# Patient Record
Sex: MALE | Race: WHITE | NOT HISPANIC OR LATINO | URBAN - METROPOLITAN AREA
[De-identification: names, ages, dates, MRNs, and addresses within clinical notes are randomized per-mention and may not be internally consistent; named-entity substitution may affect disease eponyms.]

---

## 2018-02-06 ENCOUNTER — HOSPITAL ENCOUNTER (OUTPATIENT)
Facility: MEDICAL CENTER | Age: 53
End: 2018-02-07
Attending: SURGERY | Admitting: SURGERY

## 2018-02-06 DIAGNOSIS — G89.18 POST-OPERATIVE PAIN: ICD-10-CM

## 2018-02-06 DIAGNOSIS — K40.90 RIGHT INGUINAL HERNIA: ICD-10-CM

## 2018-02-06 PROCEDURE — 700111 HCHG RX REV CODE 636 W/ 250 OVERRIDE (IP)

## 2018-02-06 PROCEDURE — 502704 HCHG DEVICE, LIGASURE IMPACT: Performed by: SURGERY

## 2018-02-06 PROCEDURE — 700102 HCHG RX REV CODE 250 W/ 637 OVERRIDE(OP): Performed by: SURGERY

## 2018-02-06 PROCEDURE — 160003 HCHG RECOVERY MINUTES (EXTENDED) STAT: Performed by: SURGERY

## 2018-02-06 PROCEDURE — 160039 HCHG SURGERY MINUTES - EA ADDL 1 MIN LEVEL 3: Performed by: SURGERY

## 2018-02-06 PROCEDURE — A9270 NON-COVERED ITEM OR SERVICE: HCPCS | Performed by: SURGERY

## 2018-02-06 PROCEDURE — 88300 SURGICAL PATH GROSS: CPT

## 2018-02-06 PROCEDURE — 700102 HCHG RX REV CODE 250 W/ 637 OVERRIDE(OP)

## 2018-02-06 PROCEDURE — 160002 HCHG RECOVERY MINUTES (STAT): Performed by: SURGERY

## 2018-02-06 PROCEDURE — 160028 HCHG SURGERY MINUTES - 1ST 30 MINS LEVEL 3: Performed by: SURGERY

## 2018-02-06 PROCEDURE — 700112 HCHG RX REV CODE 229: Performed by: SURGERY

## 2018-02-06 PROCEDURE — G0378 HOSPITAL OBSERVATION PER HR: HCPCS

## 2018-02-06 PROCEDURE — A9270 NON-COVERED ITEM OR SERVICE: HCPCS

## 2018-02-06 PROCEDURE — 160048 HCHG OR STATISTICAL LEVEL 1-5: Performed by: SURGERY

## 2018-02-06 PROCEDURE — 501838 HCHG SUTURE GENERAL: Performed by: SURGERY

## 2018-02-06 PROCEDURE — 501445 HCHG STAPLER, SKIN DISP: Performed by: SURGERY

## 2018-02-06 PROCEDURE — 90686 IIV4 VACC NO PRSV 0.5 ML IM: CPT | Performed by: SURGERY

## 2018-02-06 PROCEDURE — 700101 HCHG RX REV CODE 250

## 2018-02-06 PROCEDURE — 700111 HCHG RX REV CODE 636 W/ 250 OVERRIDE (IP): Performed by: SURGERY

## 2018-02-06 PROCEDURE — 90471 IMMUNIZATION ADMIN: CPT

## 2018-02-06 PROCEDURE — 500380 HCHG DRAIN, PENROSE 1/4X12: Performed by: SURGERY

## 2018-02-06 PROCEDURE — 160035 HCHG PACU - 1ST 60 MINS PHASE I: Performed by: SURGERY

## 2018-02-06 PROCEDURE — 502000 HCHG MISC OR IMPLANTS RC 0278: Performed by: SURGERY

## 2018-02-06 PROCEDURE — 160009 HCHG ANES TIME/MIN: Performed by: SURGERY

## 2018-02-06 PROCEDURE — 160036 HCHG PACU - EA ADDL 30 MINS PHASE I: Performed by: SURGERY

## 2018-02-06 DEVICE — IMPLANTABLE DEVICE: Type: IMPLANTABLE DEVICE | Site: GROIN | Status: FUNCTIONAL

## 2018-02-06 RX ORDER — ENEMA 19; 7 G/133ML; G/133ML
1 ENEMA RECTAL
Status: DISCONTINUED | OUTPATIENT
Start: 2018-02-06 | End: 2018-02-07 | Stop reason: HOSPADM

## 2018-02-06 RX ORDER — CELECOXIB 200 MG/1
CAPSULE ORAL
Status: COMPLETED
Start: 2018-02-06 | End: 2018-02-06

## 2018-02-06 RX ORDER — BISACODYL 10 MG
10 SUPPOSITORY, RECTAL RECTAL
Status: DISCONTINUED | OUTPATIENT
Start: 2018-02-06 | End: 2018-02-07 | Stop reason: HOSPADM

## 2018-02-06 RX ORDER — DOCUSATE SODIUM 100 MG/1
100 CAPSULE, LIQUID FILLED ORAL 2 TIMES DAILY
Status: DISCONTINUED | OUTPATIENT
Start: 2018-02-06 | End: 2018-02-07 | Stop reason: HOSPADM

## 2018-02-06 RX ORDER — AMOXICILLIN 250 MG
1 CAPSULE ORAL
Status: DISCONTINUED | OUTPATIENT
Start: 2018-02-06 | End: 2018-02-07 | Stop reason: HOSPADM

## 2018-02-06 RX ORDER — LIDOCAINE AND PRILOCAINE 25; 25 MG/G; MG/G
1 CREAM TOPICAL
Status: ACTIVE | OUTPATIENT
Start: 2018-02-06 | End: 2018-02-07

## 2018-02-06 RX ORDER — MIDAZOLAM HYDROCHLORIDE 1 MG/ML
INJECTION INTRAMUSCULAR; INTRAVENOUS
Status: DISPENSED
Start: 2018-02-06 | End: 2018-02-06

## 2018-02-06 RX ORDER — OLANZAPINE 20 MG/1
20 TABLET ORAL NIGHTLY
COMMUNITY

## 2018-02-06 RX ORDER — SODIUM CHLORIDE, SODIUM LACTATE, POTASSIUM CHLORIDE, CALCIUM CHLORIDE 600; 310; 30; 20 MG/100ML; MG/100ML; MG/100ML; MG/100ML
INJECTION, SOLUTION INTRAVENOUS CONTINUOUS
Status: DISCONTINUED | OUTPATIENT
Start: 2018-02-06 | End: 2018-02-06

## 2018-02-06 RX ORDER — OXYCODONE HYDROCHLORIDE 5 MG/1
5 TABLET ORAL EVERY 4 HOURS PRN
Status: DISCONTINUED | OUTPATIENT
Start: 2018-02-06 | End: 2018-02-07 | Stop reason: HOSPADM

## 2018-02-06 RX ORDER — LIDOCAINE HYDROCHLORIDE 10 MG/ML
0.5 INJECTION, SOLUTION INFILTRATION; PERINEURAL
Status: ACTIVE | OUTPATIENT
Start: 2018-02-06 | End: 2018-02-07

## 2018-02-06 RX ORDER — ACETAMINOPHEN 325 MG/1
650 TABLET ORAL EVERY 4 HOURS PRN
Status: DISCONTINUED | OUTPATIENT
Start: 2018-02-06 | End: 2018-02-07 | Stop reason: HOSPADM

## 2018-02-06 RX ORDER — LIDOCAINE HYDROCHLORIDE 10 MG/ML
INJECTION, SOLUTION INFILTRATION; PERINEURAL
Status: COMPLETED
Start: 2018-02-06 | End: 2018-02-06

## 2018-02-06 RX ORDER — OLANZAPINE 5 MG/1
20 TABLET ORAL NIGHTLY
Status: DISCONTINUED | OUTPATIENT
Start: 2018-02-06 | End: 2018-02-07 | Stop reason: HOSPADM

## 2018-02-06 RX ORDER — GABAPENTIN 300 MG/1
CAPSULE ORAL
Status: COMPLETED
Start: 2018-02-06 | End: 2018-02-06

## 2018-02-06 RX ORDER — BETAMETHASONE DIPROPIONATE 0.5 MG/G
1 CREAM TOPICAL 2 TIMES DAILY
COMMUNITY

## 2018-02-06 RX ORDER — ONDANSETRON 2 MG/ML
4 INJECTION INTRAMUSCULAR; INTRAVENOUS EVERY 4 HOURS PRN
Status: DISCONTINUED | OUTPATIENT
Start: 2018-02-06 | End: 2018-02-07 | Stop reason: HOSPADM

## 2018-02-06 RX ORDER — ACETAMINOPHEN 500 MG
TABLET ORAL
Status: COMPLETED
Start: 2018-02-06 | End: 2018-02-06

## 2018-02-06 RX ORDER — BUPIVACAINE HYDROCHLORIDE AND EPINEPHRINE 5; 5 MG/ML; UG/ML
INJECTION, SOLUTION EPIDURAL; INTRACAUDAL; PERINEURAL
Status: DISCONTINUED | OUTPATIENT
Start: 2018-02-06 | End: 2018-02-06 | Stop reason: HOSPADM

## 2018-02-06 RX ORDER — OXYCODONE HYDROCHLORIDE 5 MG/1
5-10 TABLET ORAL EVERY 4 HOURS PRN
Qty: 40 TAB | Refills: 0 | Status: SHIPPED | OUTPATIENT
Start: 2018-02-06 | End: 2018-02-13

## 2018-02-06 RX ORDER — OXYCODONE HYDROCHLORIDE 10 MG/1
10 TABLET ORAL EVERY 4 HOURS PRN
Status: DISCONTINUED | OUTPATIENT
Start: 2018-02-06 | End: 2018-02-07 | Stop reason: HOSPADM

## 2018-02-06 RX ORDER — POLYETHYLENE GLYCOL 3350 17 G/17G
1 POWDER, FOR SOLUTION ORAL 2 TIMES DAILY
Status: DISCONTINUED | OUTPATIENT
Start: 2018-02-06 | End: 2018-02-07 | Stop reason: HOSPADM

## 2018-02-06 RX ORDER — OXYCODONE HCL 5 MG/5 ML
SOLUTION, ORAL ORAL
Status: COMPLETED
Start: 2018-02-06 | End: 2018-02-06

## 2018-02-06 RX ORDER — AMOXICILLIN 250 MG
1 CAPSULE ORAL NIGHTLY
Status: DISCONTINUED | OUTPATIENT
Start: 2018-02-06 | End: 2018-02-07 | Stop reason: HOSPADM

## 2018-02-06 RX ADMIN — OLANZAPINE 20 MG: 5 TABLET, FILM COATED ORAL at 23:17

## 2018-02-06 RX ADMIN — INFLUENZA A VIRUS A/MICHIGAN/45/2015 X-275 (H1N1) ANTIGEN (FORMALDEHYDE INACTIVATED), INFLUENZA A VIRUS A/HONG KONG/4801/2014 X-263B (H3N2) ANTIGEN (FORMALDEHYDE INACTIVATED), INFLUENZA B VIRUS B/PHUKET/3073/2013 ANTIGEN (FORMALDEHYDE INACTIVATED), AND INFLUENZA B VIRUS B/BRISBANE/60/2008 ANTIGEN (FORMALDEHYDE INACTIVATED) 0.5 ML: 15; 15; 15; 15 INJECTION, SUSPENSION INTRAMUSCULAR at 23:10

## 2018-02-06 RX ADMIN — GABAPENTIN 600 MG: 300 CAPSULE ORAL at 09:15

## 2018-02-06 RX ADMIN — ACETAMINOPHEN 650 MG: 325 TABLET, FILM COATED ORAL at 23:13

## 2018-02-06 RX ADMIN — ACETAMINOPHEN 1000 MG: 500 TABLET, FILM COATED ORAL at 09:15

## 2018-02-06 RX ADMIN — CELECOXIB 400 MG: 200 CAPSULE ORAL at 09:15

## 2018-02-06 RX ADMIN — STANDARDIZED SENNA CONCENTRATE AND DOCUSATE SODIUM 1 TABLET: 8.6; 5 TABLET, FILM COATED ORAL at 23:15

## 2018-02-06 RX ADMIN — SODIUM CHLORIDE, SODIUM LACTATE, POTASSIUM CHLORIDE, CALCIUM CHLORIDE: 600; 310; 30; 20 INJECTION, SOLUTION INTRAVENOUS at 08:15

## 2018-02-06 RX ADMIN — POLYETHYLENE GLYCOL 3350 1 PACKET: 17 POWDER, FOR SOLUTION ORAL at 16:38

## 2018-02-06 RX ADMIN — OXYCODONE HYDROCHLORIDE 5 MG: 5 TABLET ORAL at 17:24

## 2018-02-06 RX ADMIN — LIDOCAINE HYDROCHLORIDE 0.5 ML: 10 INJECTION, SOLUTION INFILTRATION; PERINEURAL at 08:15

## 2018-02-06 RX ADMIN — DOCUSATE SODIUM 100 MG: 100 CAPSULE ORAL at 16:38

## 2018-02-06 RX ADMIN — FENTANYL CITRATE 50 MCG: 50 INJECTION, SOLUTION INTRAMUSCULAR; INTRAVENOUS at 11:45

## 2018-02-06 RX ADMIN — VALPROIC ACID 1500 MG: 250 SOLUTION ORAL at 23:18

## 2018-02-06 RX ADMIN — OXYCODONE HYDROCHLORIDE 10 MG: 5 SOLUTION ORAL at 11:58

## 2018-02-06 ASSESSMENT — COGNITIVE AND FUNCTIONAL STATUS - GENERAL
SUGGESTED CMS G CODE MODIFIER MOBILITY: CH
MOBILITY SCORE: 24
DAILY ACTIVITIY SCORE: 24
SUGGESTED CMS G CODE MODIFIER DAILY ACTIVITY: CH

## 2018-02-06 ASSESSMENT — PATIENT HEALTH QUESTIONNAIRE - PHQ9
SUM OF ALL RESPONSES TO PHQ9 QUESTIONS 1 AND 2: 0
1. LITTLE INTEREST OR PLEASURE IN DOING THINGS: NOT AT ALL
2. FEELING DOWN, DEPRESSED, IRRITABLE, OR HOPELESS: NOT AT ALL
SUM OF ALL RESPONSES TO PHQ QUESTIONS 1-9: 0

## 2018-02-06 ASSESSMENT — PAIN SCALES - GENERAL
PAINLEVEL_OUTOF10: 3
PAINLEVEL_OUTOF10: 6
PAINLEVEL_OUTOF10: 5
PAINLEVEL_OUTOF10: 6
PAINLEVEL_OUTOF10: 1
PAINLEVEL_OUTOF10: 1
PAINLEVEL_OUTOF10: 5
PAINLEVEL_OUTOF10: 2
PAINLEVEL_OUTOF10: 6
PAINLEVEL_OUTOF10: 3

## 2018-02-06 ASSESSMENT — LIFESTYLE VARIABLES
EVER_SMOKED: YES
DO YOU DRINK ALCOHOL: NO

## 2018-02-06 ASSESSMENT — COPD QUESTIONNAIRES
DO YOU EVER COUGH UP ANY MUCUS OR PHLEGM?: NO/ONLY WITH OCCASIONAL COLDS OR INFECTIONS
DURING THE PAST 4 WEEKS HOW MUCH DID YOU FEEL SHORT OF BREATH: NONE/LITTLE OF THE TIME
COPD SCREENING SCORE: 3
HAVE YOU SMOKED AT LEAST 100 CIGARETTES IN YOUR ENTIRE LIFE: YES

## 2018-02-06 NOTE — OP REPORT
DATE OF SERVICE:  02/06/2018    PREOPERATIVE DIAGNOSIS:  Large reducible symptomatic right inguinal hernia.    POSTOPERATIVE DIAGNOSIS:  Large reducible symptomatic right inguinal hernia.    PROCEDURE PERFORMED:  Right inguinal hernia repair with mesh.    PRIMARY SURGEON:  Henry Ryan MD    ASSISTANT:  None.    ANESTHESIOLOGIST:  Bob Gomes MD.    ANESTHESIA:  General laryngeal mask airway.    ESTIMATED BLOOD LOSS:  20 mL    COMPLICATIONS:  None immediately apparent.    SPECIMENS:  Segment of hernia sac.    OPERATIVE FINDINGS:  Patient had a very large indirect hernia sac that was   densely scarred to the surrounding abdominal wall musculature, the inguinal   canal, and to the spermatic cord structures.    DESCRIPTION OF PROCEDURE:  The patient was taken back to the operating room   and placed in supine position.  General anesthesia was induced.  The patient   had a laryngeal mask airway was placed.  Bilateral SCD devices were placed.    Appropriate IV antibiotics were given.  All bony prominences were carefully   protected.  The right lower abdomen was then widely prepped and draped in a   sterile fashion.  A time-out was performed.  The patient and procedure, both   verified.    A 6 cm incision was made over the inguinal crease.  The subcutaneous tissues   were divided down to level of the external oblique aponeurosis using   electrocautery.  The aponeurosis was then opened along its fibers through the   external ring.  Careful meticulous dissection of the hernia sac along with the   cord structures from the pubic tubercle was then undertaken.  This was very   tedious and difficult dissection given the amount of inflammation and scarring   in this area.  Once freed, the inguinal floor was inspected.  It was noted to   be intact.  There was no evidence of a direct inguinal hernia.  The cord   structures were then thoroughly inspected.  Hernia sac was attempted to be   dissected free from these; however, it  was very densely adherent to the cord   structures.  I was able to create a small window along the hernia sac and   enter it.  There was a release of serous abdominal fluid.  I was able to   carefully bisect the hernia sac using a combination of electrocautery and   LigaSure impact device.  Unfortunately, due to how adherent sac was to the   cord structures, I was not able to excise it.  The more proximal part of the   hernia sac was taken back to the internal ring and then closed in a running   fashion with a 3-0 Vicryl suture.  This sealed off the abdominal compartment.    I then thinned out the cord structures, freeing it from the cremaster muscles   as well as the adjacent cord lipomas.  A 4x6 inch lightweight mesh was then   cut to fit the patient's anatomy.  This was secured medially to the pubic   tubercle, inferiorly along the shelving edge of the inguinal ligament, and   superiorly along the conjoined tendon using interrupted 0 Ethibond sutures.  A   slit had been created in the mesh to accommodate the cord structures.  The   ring was recreated with a single Ethibond suture.  The legs of the mesh were   placed out laterally towards the anterior superior iliac spine.  The mesh in   general was noted to be lying flat without undue tension.  Content with my   repair, I then reapproximated the external oblique aponeurosis with a running   3-0 Vicryl suture.  The Bari's fascia was reapproximated using interrupted   3-0 Vicryl sutures as well.  The wound was approximated using interrupted 3-0   Vicryl deep dermal sutures.  The skin was closed with a running 4-0 Monocryl   subcuticular suture.  The area was cleaned and Dermabond was applied.    Overall, the patient tolerated this procedure well.  The laryngeal mask airway   was removed in the operating room.  He was taken to the PACU in stable   condition.  All sharps and sponge counts were correct by end of the case on 2   occasions.    The patient will be  admitted for overnight observation as dictated by his care   facility; otherwise, this patient would meet criteria for discharge in the   immediate postoperative period.    ASA SCORE:  2.    WOUND CLASSIFICATION:  Clean.       ____________________________________     MD LARRY Rodriguez / BRUCE    DD:  02/06/2018 11:50:55  DT:  02/06/2018 12:20:27    D#:  [id number]  Job#:  [id number]

## 2018-02-06 NOTE — PROGRESS NOTES
IMMEDIATE POST-OPERATIVE NOTE    PREOPERATIVE DIAGNOSIS: Large right inguinal hernia    POSTOPERATIVE DIAGNOSIS: Same    PROCEDURE PERFORMED: Inguinal hernia repair with mesh     SURGEON: Henry Ryan MD    ASSISTANT: None    ANESTHESIOLOGIST:  Bob Gomes MD., MD    ANESTHESIA: GLMA    FINDINGS:  Refer to my dictated operative note.     SPECIMEN: Segment of hernia sac     ESTIMATED BLOOD LOSS: 20 mL      ____________________________________   Sarthak JUAREZ / NTS     DD: 2/6/2018   DT: 11:29 AM

## 2018-02-06 NOTE — H&P
Surgical History and Physical    Date of Service: 2/6/2018    PCP: No primary care provider on file.    CC: Right inguinal hernia    HPI: This is a 52 y.o. male who is presenting for repair of the above.  He has been seen in my clinic for evaluation prior.    Past Medical History:  Past Medical History:   Diagnosis Date   • Asthma    • Hypoglycemia     as a kid   • Psychiatric problem        Past Surgical History:  Past Surgical History:   Procedure Laterality Date   • CRANIOTOMY  2009   • HYPOSPADIAS REPAIR      as a kid       Medications:  Current Facility-Administered Medications   Medication Dose Route Frequency Provider Last Rate Last Dose   • lidocaine-prilocaine (EMLA) 2.5-2.5 % cream 1 Application  1 Application Topical Once PRN Henry Ryan M.D.        Or   • lidocaine (XYLOCAINE) 1%  injection  0.5 mL Intradermal Once PRN Henry Ryan M.D.   0.5 mL at 02/06/18 0815   • lactated ringers infusion   Intravenous Continuous Henry Ryan M.D. 10 mL/hr at 02/06/18 0815         Social History:  Social History     Social History   • Marital status: Single     Spouse name: N/A   • Number of children: N/A   • Years of education: N/A     Occupational History   • Not on file.     Social History Main Topics   • Smoking status: Former Smoker     Packs/day: 1.50     Years: 30.00     Types: Cigarettes     Start date: 2/6/2017   • Smokeless tobacco: Never Used   • Alcohol use Yes      Comment: no currently- drank 2 handles a day   • Drug use: Yes     Types: Inhaled      Comment: marijuana- not currently using   • Sexual activity: Not on file     Other Topics Concern   • Not on file     Social History Narrative   • No narrative on file       Family History:  History reviewed. No pertinent family history.    Allergies:  Insulin; Other drug; Cogentin [benztropine]; Haldol [haloperidol]; Prolixin [permitil]; and Thorazine [chlorpromazine]    Review of Systems:  Constitutional: Negative for fever, chills, weight  "loss, malaise/fatigue and diaphoresis.   HENT: Negative for hearing loss, ear pain, nosebleeds, congestion, sore throat, neck pain, tinnitus and ear discharge.    Eyes: Negative for blurred vision, double vision, photophobia, pain, discharge and redness.   Respiratory: Negative for cough, hemoptysis, sputum production, shortness of breath, wheezing and stridor.    Cardiovascular: Negative for chest pain, palpitations, orthopnea, claudication, leg swelling and PND.   Gastrointestinal: Negative for heartburn, nausea, vomiting, abdominal pain, diarrhea, constipation, blood in stool and melena.   Genitourinary: Negative for dysuria, urgency, frequency, hematuria and flank pain.   Musculoskeletal: Negative for myalgias, back pain, joint pain and falls.   Skin: Negative for itching and rash.  Neurological: Negative for dizziness, tingling, tremors, sensory change, speech change, focal weakness, seizures, loss of consciousness, weakness and headaches.   Endo/Heme/Allergies: Negative for environmental allergies and polydipsia. Does not bruise/bleed easily.   Psychiatric/Behavioral: Negative for depression, suicidal ideas, hallucinations, memory loss and substance abuse. The patient is not nervous/anxious and does not have insomnia.    Physical Exam:  Blood pressure 100/68, pulse 67, temperature 36.5 °C (97.7 °F), resp. rate 16, height 1.93 m (6' 4\"), weight 83.6 kg (184 lb 4.9 oz), SpO2 97 %.    GENERAL:  The patient is healthy-appearing and in no acute distress  HEENT:  Atraumatic, normocephalic.  Normal pinna bilaterally.  External auditory canals are without discharge.  Conjunctivae and sclerae are clear. Extraocular movements are full. Pupils are equal, round, and reactive to light.  Oral mucosa is moist.  NECK:  Soft and supple without lymphadenopathy. No masses are noted.  Trachea is midline.  CHEST:  Lungs are clear to auscultation bilaterally.  No masses, lesions, or signs of trauma were noted.    CARDIOVASCULAR:  " Regular rate and rhythm.  No murmurs appreciated.  No JVD.  Palpable pulses present in all four extremities.    ABDOMEN:  Soft, non-tender, non-distended.  Non-tympanitic.  No hepatomegaly or splenomegaly.  Large, partially reducible right inguinal hernia.  GENITOURINARY:  The patient has normal external reproductive anatomy.  LYMPHATIC:  There is no adenopathy in the cervical, supraclavicular, infraclavicular, axillary or inguinal regions.  SKIN:  Warm and well perfused. No rashes.  NEUROLOGIC:  Alert and oriented. Cranial nerves II through XII are grossly intact. Motor and sensory exams are normal in all four extremities. Motor and sensory reflexes are 2+ and symmetric with bilateral plantar responses.  PSYCHIATRIC: Affect and mood is appropriate for age and condition.    Assessment:   1) Large symptomatic right inguinal hernia:  Risk and benefits discussed prior in clinic.  Questions answered    Plan  To OR for repair with mesh  ____________________________________   Sarthak JUAREZ / BRUCE     DD: 2/6/2018   DT: 8:42 AM

## 2018-02-06 NOTE — PROGRESS NOTES
Patient up from PACU and is alert and oriented, vss, denies pain at this time. Incision intact with dermabond. Patient with guard in room. Pleasant and all needs met. Will monitor. Plan for discharge tomorrow.

## 2018-02-06 NOTE — CARE PLAN
Problem: Pain Management  Goal: Pain level will decrease to patient's comfort goal  Patient calm and relaxed and complains of very minimal discomfort at incision site. Patient denies needing intervention at this time.

## 2018-02-07 VITALS
HEART RATE: 76 BPM | SYSTOLIC BLOOD PRESSURE: 120 MMHG | OXYGEN SATURATION: 95 % | BODY MASS INDEX: 22.44 KG/M2 | HEIGHT: 76 IN | DIASTOLIC BLOOD PRESSURE: 77 MMHG | RESPIRATION RATE: 18 BRPM | WEIGHT: 184.3 LBS | TEMPERATURE: 98.5 F

## 2018-02-07 PROCEDURE — 700112 HCHG RX REV CODE 229: Performed by: SURGERY

## 2018-02-07 PROCEDURE — 700102 HCHG RX REV CODE 250 W/ 637 OVERRIDE(OP): Performed by: SURGERY

## 2018-02-07 PROCEDURE — G0378 HOSPITAL OBSERVATION PER HR: HCPCS

## 2018-02-07 PROCEDURE — A9270 NON-COVERED ITEM OR SERVICE: HCPCS | Performed by: SURGERY

## 2018-02-07 RX ADMIN — ACETAMINOPHEN 650 MG: 325 TABLET, FILM COATED ORAL at 08:17

## 2018-02-07 RX ADMIN — DOCUSATE SODIUM 100 MG: 100 CAPSULE ORAL at 08:17

## 2018-02-07 RX ADMIN — ACETAMINOPHEN 650 MG: 325 TABLET, FILM COATED ORAL at 03:43

## 2018-02-07 ASSESSMENT — PAIN SCALES - GENERAL
PAINLEVEL_OUTOF10: 5
PAINLEVEL_OUTOF10: 7
PAINLEVEL_OUTOF10: 4

## 2018-02-07 NOTE — CARE PLAN
Problem: Pain Management  Goal: Pain level will decrease to patient's comfort goal    Intervention: Follow pain managment plan developed in collaboration with patient and Interdisciplinary Team  Pharmacologic and non pharmacologic measures in place to help patient manage pain. Pt states ice packs and tylenol are effective in helping him manage his pain.       Problem: Bowel/Gastric:  Goal: Normal bowel function is maintained or improved  Outcome: PROGRESSING AS EXPECTED  Bowel medications given.

## 2018-02-07 NOTE — PROGRESS NOTES
"Trauma / Surgical Progress Note  Interval Events:  No issues overnight  Able to urinate  States he's a bit sore  Has only needed Tylenol    -OK for discharge  -Does not need oxycodone Rx  -Follow up 2 weeks  -Discharge instructions entered as a nursing discharge communication    ROS    Vitals:  Blood pressure 120/77, pulse 76, temperature 36.9 °C (98.5 °F), resp. rate 18, height 1.93 m (6' 3.98\"), weight 83.6 kg (184 lb 4.9 oz), SpO2 95 %.  Temp (24hrs), Av.9 °C (98.5 °F), Min:36.6 °C (97.8 °F), Max:37.3 °C (99.1 °F)      IO:    Date 18 0700 - 18 0659   Shift 5309-4505 [id number] 9508-1576 24 Hour Total   I  N  T  A  K  E   Shift Total       O  U  T  P  U  T   Urine 600   600      Void (ml) 600   600    Shift Total 600   600   NET -600   -600       Exam:  GENERAL:  No acute distress  HEENT: Pupils equal, round and reactive to light bilaterally.  Sclera are clear bilaterally.  No otorrhea.  No rhinorrhea.  Mucus membranes are moist.  CHEST:  Lungs are clear to auscultation bilaterally  CARDIOVASCULAR:  Regular rate and rhythm  ABDOMEN: Soft, non-tender, non-distended, Right groin with expected post-op swelling, incision intact.  GENITOURINARY:  No sales in place, voiding without issues  EXTREMITIES:  Good range of motion through out  NEUROLOGIC:  Alert and oriented.  Cranial Nerves II through XII are grossly intact, no focal deficits appreciated  PSYCHIATRIC:  Normal affect and mood appropriate for age and condition  SKIN:  Warm and well perfused, no rashes      Labs:  No results found for this or any previous visit (from the past 24 hour(s)).    Problem and Plan:  Active Hospital Problems    Diagnosis   • Right inguinal hernia [K40.90]   • Post-operative pain [G89.18]       Core Measures & Quality Metrics:  Core Measures & Quality Metrics     Sarthak Ryan MD    DATE OF SERVICE: 2018    "

## 2018-02-07 NOTE — DISCHARGE INSTRUCTIONS
Discharge Instructions    Discharged to other by medical transportation with escort. Discharged via wheelchair, hospital escort: Yes.  Special equipment needed: Not Applicable    Be sure to schedule a follow-up appointment with your primary care doctor or any specialists as instructed.     Discharge Plan:   Diet Plan: Discussed  Activity Level: Discussed  Confirmed Follow up Appointment: Appointment Scheduled  Confirmed Symptoms Management: Discussed  Medication Reconciliation Updated: Yes  Influenza Vaccine Indication: Indicated: 9 to 64 years of age  Influenza Vaccine Given - only chart on this line when given: Influenza Vaccine Given (See MAR)    I understand that a diet low in cholesterol, fat, and sodium is recommended for good health. Unless I have been given specific instructions below for another diet, I accept this instruction as my diet prescription.   Other diet: regular    Special Instructions:     Inguinal Hernia Repair Discharge Instructions:    1. DIET: After discharge from the hospital you may resume your normal preoperative diet without restrictions.    2. ACTIVITIES: After discharge from the hospital, you may resume full routine activities. Heavy lifting (over 15 pounds) and strenuous activities will make your incision sore and should be avoided for one month after surgery. Routine activities of daily living such as walking, going up and down stairs are acceptable.    3. DRIVING: You may drive whenever you are no longer taking narcotic pain medications and are able to perform the activities needed to drive safely, i.e. turning, bending, twisting, etc.    4. WOUND/BATHING: You may get the wound wet at any time after leaving the hospital. You may shower, but do not submerge in a bath or pool until seen for follow up. You may remove the skin glue using a finger or a pair of tweezers in one week.    5. BOWEL FUNCTION: The combination of pain medication and decreased activity level can cause  constipation in otherwise normal patients. If you feel this is occurring, take a laxative (Milk of Magnesia, Ex-Lax, Senokot, Miralax, Magnesium Citrate) until the problem has resolved.    6. PAIN MEDICATION: You will be given a prescription for pain medication at discharge. Please take these as directed. It is advisable to take your medication around the clock for the first 24 to 48 hours. You may continue any non-steroidal anti-inflammatory medications (NSAIDs) such as Advil in the post-operative period. These may and should be taken with your narcotic pain medication. It is important to remember not to take medications on an empty stomach as this may cause nausea. Do not consume alcohol while taking pain medication.    7. WHAT TO EXPECT: You may develop swelling and bruising at the base of your genitalia and within the scrotum (males) or labias (females).  This is due to bleeding from the operative site tracking down into these areas.  The bleeding typically stops within a few hours after surgery.  The bruising may take several weeks to resolve.      8. CALL IF YOU HAVE: (1) Fevers to more than 101F, (2) Unusual chest or leg pain, (3) Drainage or fluid from incision that may be foul smelling, increased tenderness or soreness at the wound or the wound edges are no longer together, redness or swelling at the incision site.  (4) Profound swelling at the incision site or in the genitals.    9. FOLLOW-UP: Call and schedule a follow up appointment in 2 weeks.    If you have any additional questions at all, please do not hesitate to call the office and speak to my medical assistant, myself, or the physician on call.    [address], [address]  [place] [address]    Sarthak Ryan MD  [facility]  [phone]    · Is patient discharged on Warfarin / Coumadin?   No     Depression / Suicide Risk    As you are discharged from this [facility] facility, it is important to learn how to keep safe from harming  yourself.    Recognize the warning signs:  · Abrupt changes in personality, positive or negative- including increase in energy   · Giving away possessions  · Change in eating patterns- significant weight changes-  positive or negative  · Change in sleeping patterns- unable to sleep or sleeping all the time   · Unwillingness or inability to communicate  · Depression  · Unusual sadness, discouragement and loneliness  · Talk of wanting to die  · Neglect of personal appearance   · Rebelliousness- reckless behavior  · Withdrawal from people/activities they love  · Confusion- inability to concentrate     If you or a loved one observes any of these behaviors or has concerns about self-harm, here's what you can do:  · Talk about it- your feelings and reasons for harming yourself  · Remove any means that you might use to hurt yourself (examples: pills, rope, extension cords, firearm)  · Get professional help from the community (Mental Health, Substance Abuse, psychological counseling)  · Do not be alone:Call your Safe Contact- someone whom you trust who will be there for you.  · Call your local CRISIS HOTLINE 086-3944 or 799-541-0455  · Call your local Children's Mobile Crisis Response Team [place] (684) 682-6180 or www.[employer]  · Call the toll free National Suicide Prevention Hotlines   · National Suicide Prevention Lifeline 802-673-OGNT (0834)  · National Hope Line Network 800-SUICIDE (076-9906)

## 2018-02-07 NOTE — PROGRESS NOTES
Bedside report received.  Assessment complete.  A&O x 4. Patient calls appropriately.  Denies numbness and tingling.   Patient up with standby assist. Guard at bedside   Patient declines pain at this time.  Denies N&V. Tolerating regular diet.  Surgical site noted to right inguinal area, closed with dermabond.  positive void, positive flatus  Patient denies SOB.  SCD's on.   Reviewed plan with of care with patient. Call light and personal belongings with in reach. Hourly rounding in place. All needs met at this time.

## 2018-02-07 NOTE — PROGRESS NOTES
Patient discharged to back [facility] with [facility] [employer]. IV removed. Opoid Informed consent not needed. Patient not using opioids at time of discharge. Report given to AMBER Hernández at [facility].

## 2018-02-07 NOTE — CARE PLAN
Problem: Pain Management  Goal: Pain level will decrease to patient's comfort goal  Outcome: PROGRESSING AS EXPECTED  patient denied pain upon arrival to the unit, given oxycodone 5mg when complained of pain and will monitor for effectiveness.

## 2018-02-07 NOTE — PROGRESS NOTES
Bedside report received.  Assessment complete.  A&O x 4. Patient calls appropriately.  Patient ambualtes with standby assist.   Patient has 7/10 pain. Medicated per MAR>  Denies N&V. Tolerating regular diet.  Surgical incision right groin approximated with dermabond.  + void, + flatus  Patient denies SOB.  SCD's on.  [facility]. One guard at bedside.  Review plan with of care with patient. Call light and personal belongings with in reach. Hourly rounding in place. All needs met at this time.

## (undated) DEVICE — DETERGENT RENUZYME PLUS 10 OZ PACKET (50/BX)

## (undated) DEVICE — SET EXTENSION WITH 2 PORTS (48EA/CA) ***PART #2C8610 IS A SUBSTITUTE*****

## (undated) DEVICE — PROTECTOR ULNA NERVE - (36PR/CA)

## (undated) DEVICE — PACK MINOR BASIN - (2EA/CA)

## (undated) DEVICE — KIT ANESTHESIA W/CIRCUIT & 3/LT BAG W/FILTER (20EA/CA)

## (undated) DEVICE — SPONGE XRAY 8X4 STERL. 12PL - (10EA/TY 80TY/CA)

## (undated) DEVICE — SET LEADWIRE 5 LEAD BEDSIDE DISPOSABLE ECG (1SET OF 5/EA)

## (undated) DEVICE — GLOVE BIOGEL SZ 7.5 SURGICAL PF LTX - (50PR/BX 4BX/CA)

## (undated) DEVICE — SUTURE 3-0 VICRYL PLUS SH - 8X 18 INCH (12/BX)

## (undated) DEVICE — LIGASURE TISSUE FUSION  - SINGLE USE (6/CA)

## (undated) DEVICE — MASK ANESTHESIA ADULT  - (100/CA)

## (undated) DEVICE — TUBING CLEARLINK DUO-VENT - C-FLO (48EA/CA)

## (undated) DEVICE — SUTURE 0 ETHIBOND MO6 C/R - (12/BX) 8-18 INCH ETHICON

## (undated) DEVICE — NEPTUNE 4 PORT MANIFOLD - (20/PK)

## (undated) DEVICE — CHLORAPREP 26 ML APPLICATOR - ORANGE TINT(25/CA)

## (undated) DEVICE — HEAD HOLDER JUNIOR/ADULT

## (undated) DEVICE — SYRINGE 10 ML CONTROL LL (25EA/BX 4BX/CA)

## (undated) DEVICE — SUCTION INSTRUMENT YANKAUER BULBOUS TIP W/O VENT (50EA/CA)

## (undated) DEVICE — SUTURE 3-0 VICRYL PLUS - 12 X 18 INCH (12/BX)

## (undated) DEVICE — BLADE SURGICAL #15 - (50/BX 3BX/CA)

## (undated) DEVICE — MEDICINE CUP STERILE 2 OZ - (100/CA)

## (undated) DEVICE — LACTATED RINGERS INJ 1000 ML - (14EA/CA 60CA/PF)

## (undated) DEVICE — DRAIN PENROSE STERILE 1/4 X - 18 IN  (25EA/BX)

## (undated) DEVICE — ELECTRODE DUAL RETURN W/ CORD - (50/PK)

## (undated) DEVICE — SUTURE 3-0 VICRYL PLUS SH - 27 INCH (36/BX)

## (undated) DEVICE — SODIUM CHL IRRIGATION 0.9% 1000ML (12EA/CA)

## (undated) DEVICE — DRAPE LAPAROTOMY T SHEET - (12EA/CA)

## (undated) DEVICE — SUTURE 4-0 MONOCRYL PLUS PS-2 - 27 INCH (36/BX)

## (undated) DEVICE — CANISTER SUCTION 3000ML MECHANICAL FILTER AUTO SHUTOFF MEDI-VAC NONSTERILE LF DISP  (40EA/CA)

## (undated) DEVICE — KIT ROOM DECONTAMINATION

## (undated) DEVICE — SENSOR SPO2 NEO LNCS ADHESIVE (20/BX) SEE USER NOTES

## (undated) DEVICE — SUTURE GENERAL

## (undated) DEVICE — ELECTRODE 850 FOAM ADHESIVE - HYDROGEL RADIOTRNSPRNT (50/PK)

## (undated) DEVICE — STAPLER SKIN DISP - (6/BX 10BX/CA) VISISTAT